# Patient Record
Sex: MALE | Race: WHITE
[De-identification: names, ages, dates, MRNs, and addresses within clinical notes are randomized per-mention and may not be internally consistent; named-entity substitution may affect disease eponyms.]

---

## 2023-05-24 ENCOUNTER — HOSPITAL ENCOUNTER (OUTPATIENT)
Dept: HOSPITAL 76 - DI | Age: 28
Discharge: HOME | End: 2023-05-24
Attending: STUDENT IN AN ORGANIZED HEALTH CARE EDUCATION/TRAINING PROGRAM
Payer: COMMERCIAL

## 2023-05-24 DIAGNOSIS — R51.9: Primary | ICD-10-CM

## 2023-05-24 DIAGNOSIS — J32.9: ICD-10-CM

## 2023-05-24 PROCEDURE — 70553 MRI BRAIN STEM W/O & W/DYE: CPT

## 2023-05-24 NOTE — MRI REPORT
PROCEDURE:  BRAIN W/WO

 

INDICATIONS:  COUGH HEAD ACHE

 

CONTRAST: gilberto 10ml 

                       

TECHNIQUE:  

Noncontrast axial T1 spin echo, axial T2 fast spin echo, sagittal and axial FLAIR, coronal T2 fast sp
in echo, axial gradient echo, axial diffusion and ADC through the brain.  After the administration of
 contrast, axial and coronal T1 spin echo with fat saturation through the brain.  

 

COMPARISON:  None.

 

FINDINGS:  

Image quality:  Excellent.  

 

CSF spaces:  Basal cisterns are patent.  No extra-axial fluid collections.  Ventricles are normal in 
size and shape.  

 

Brain:  No midline shift.  No intracranial bleeds or masses.  No abnormal intracranial enhancement.  
There is cerebral volume loss for age.  There is periventricular white matter chronic small vessel is
chemic change.  The brainstem appears normal.  Diffusion-weighted images demonstrate no acute ischemi
c insults.  No chronic ischemic insults.  Normal intravascular flow voids are present.  

 

Skull and face:  Calvarial marrow is normal in signal.  Orbits appear normal.  

 

Sinuses: Mild bilateral ethmoid sinus mucosal thickening. Sinuses and mastoids otherwise appear clear
.  

 

IMPRESSION:  

1. No acute intracranial abnormality. No recent infarct.

2. Sinus disease.

 

Reviewed by: Shala Anderson MD on 5/24/2023 4:59 PM [email or web address]

Approved by: Shala Anderson MD on 5/24/2023 4:59 PM AKDT

 

 

Station ID:  [id number]

## 2023-07-07 ENCOUNTER — HOSPITAL ENCOUNTER (OUTPATIENT)
Dept: HOSPITAL 76 - DI | Age: 28
Discharge: HOME | End: 2023-07-07
Attending: INTERNAL MEDICINE
Payer: COMMERCIAL

## 2023-07-07 DIAGNOSIS — N18.2: ICD-10-CM

## 2023-07-07 DIAGNOSIS — E23.0: Primary | ICD-10-CM

## 2023-07-07 DIAGNOSIS — E66.9: ICD-10-CM

## 2023-07-07 DIAGNOSIS — I12.9: ICD-10-CM

## 2023-07-07 PROCEDURE — 70553 MRI BRAIN STEM W/O & W/DYE: CPT

## 2023-07-10 NOTE — MRI REPORT
PROCEDURE:  BRAIN W/WO

 

INDICATIONS:  HYPOPITUITARISM

 

CONTRAST: GADAVIST 9.7 

                       

TECHNIQUE:  

Noncontrast axial T1 spin echo, axial T2 fast spin echo, sagittal and axial FLAIR, coronal T2 fast sp
in echo, axial gradient echo, axial diffusion and ADC through the brain.  After the administration of
 contrast, axial and coronal T1 spin echo with fat saturation through the brain.  

 

COMPARISON:  MRI brain 5/24/2023

 

FINDINGS:  

Image quality:  Excellent.  

 

CSF spaces:  Basal cisterns are patent.  No extra-axial fluid collections.  Ventricles are normal in 
size and shape.  

 

Brain:  No midline shift.  No intracranial bleeds or masses.  No abnormal intracranial enhancement.  
There is cerebral volume loss for age.  The brainstem appears normal.  Diffusion-weighted images demo
nstrate no acute ischemic insults.  No chronic ischemic insults.  Normal intravascular flow voids are
 present.  The pituitary is normal. No pituitary masses are identified.

 

Skull and face:  Calvarial marrow is normal in signal.  Orbits appear normal.  

 

Sinuses:  Sinuses and mastoids appear clear.  

 

IMPRESSION:  The pituitary is normal in appearance. No pituitary masses are identified.

 

Reviewed by: Mikael Fernandes MD on 7/10/2023 9:48 AM PDT

Approved by: Mikael Fernandes MD on 7/10/2023 9:48 AM PDT

 

 

Station ID:  [id number]